# Patient Record
Sex: FEMALE | Race: WHITE | NOT HISPANIC OR LATINO | URBAN - METROPOLITAN AREA
[De-identification: names, ages, dates, MRNs, and addresses within clinical notes are randomized per-mention and may not be internally consistent; named-entity substitution may affect disease eponyms.]

---

## 2020-07-31 ENCOUNTER — OTHER- (OUTPATIENT)
Dept: URBAN - METROPOLITAN AREA CLINIC 8 | Facility: CLINIC | Age: 80
Setting detail: DERMATOLOGY
End: 2020-07-31

## 2020-07-31 DIAGNOSIS — D18.01 HEMANGIOMA OF SKIN AND SUBCUTANEOUS TISSUE: ICD-10-CM

## 2020-07-31 DIAGNOSIS — L81.4 OTHER MELANIN HYPERPIGMENTATION: ICD-10-CM

## 2020-07-31 DIAGNOSIS — L85.3 XEROSIS CUTIS: ICD-10-CM

## 2020-07-31 DIAGNOSIS — L81.2 FRECKLES: ICD-10-CM

## 2020-07-31 DIAGNOSIS — L82.1 OTHER SEBORRHEIC KERATOSIS: ICD-10-CM

## 2020-07-31 DIAGNOSIS — Z85.828 PERSONAL HISTORY OF OTHER MALIGNANT NEOPLASM OF SKIN: ICD-10-CM

## 2020-07-31 DIAGNOSIS — D22.5 MELANOCYTIC NEVI OF TRUNK: ICD-10-CM

## 2020-07-31 PROCEDURE — 11104 PUNCH BX SKIN SINGLE LESION: CPT

## 2020-07-31 PROCEDURE — 99203 OFFICE O/P NEW LOW 30 MIN: CPT

## 2020-07-31 PROCEDURE — 11105 PUNCH BX SKIN EA SEP/ADDL: CPT

## 2020-08-11 ENCOUNTER — RX ONLY (RX ONLY)
Age: 80
End: 2020-08-11

## 2020-08-11 RX ORDER — TRIAMCINOLONE ACETONIDE 1 MG/G
1 APPLICATION OINTMENT TOPICAL BID
Qty: 80 | Refills: 1
Start: 2020-08-11

## 2020-08-26 ENCOUNTER — FOLLOW-UP (OUTPATIENT)
Dept: URBAN - METROPOLITAN AREA CLINIC 15 | Facility: CLINIC | Age: 80
Setting detail: DERMATOLOGY
End: 2020-08-26

## 2020-08-26 DIAGNOSIS — L82.1 OTHER SEBORRHEIC KERATOSIS: ICD-10-CM

## 2020-08-26 DIAGNOSIS — L91.8 OTHER HYPERTROPHIC DISORDERS OF THE SKIN: ICD-10-CM

## 2020-08-26 DIAGNOSIS — L81.4 OTHER MELANIN HYPERPIGMENTATION: ICD-10-CM

## 2020-08-26 DIAGNOSIS — L20.84 INTRINSIC (ALLERGIC) ECZEMA: ICD-10-CM

## 2020-08-26 PROCEDURE — 99212 OFFICE O/P EST SF 10 MIN: CPT

## 2020-08-26 PROCEDURE — 10060 I&D ABSCESS SIMPLE/SINGLE: CPT

## 2020-08-26 RX ORDER — TRIAMCINOLONE ACETONIDE 1 MG/G
1 APPLICATION OINTMENT TOPICAL BID
Qty: 80 | Refills: 2
Start: 2020-08-26

## 2020-11-30 ENCOUNTER — FOLLOW-UP (OUTPATIENT)
Dept: URBAN - METROPOLITAN AREA CLINIC 15 | Facility: CLINIC | Age: 80
Setting detail: DERMATOLOGY
End: 2020-11-30

## 2020-11-30 DIAGNOSIS — C44.729 SQUAMOUS CELL CARCINOMA OF SKIN OF LEFT LOWER LIMB, INCLUDING HIP: ICD-10-CM

## 2020-11-30 PROCEDURE — 99213 OFFICE O/P EST LOW 20 MIN: CPT

## 2023-02-08 ENCOUNTER — APPOINTMENT (OUTPATIENT)
Dept: URBAN - METROPOLITAN AREA CLINIC 216 | Age: 83
Setting detail: DERMATOLOGY
End: 2023-02-08

## 2023-02-08 DIAGNOSIS — D485 NEOPLASM OF UNCERTAIN BEHAVIOR OF SKIN: ICD-10-CM

## 2023-02-08 DIAGNOSIS — D22 MELANOCYTIC NEVI: ICD-10-CM

## 2023-02-08 PROBLEM — D48.5 NEOPLASM OF UNCERTAIN BEHAVIOR OF SKIN: Status: ACTIVE | Noted: 2023-02-08

## 2023-02-08 PROBLEM — D22.39 MELANOCYTIC NEVI OF OTHER PARTS OF FACE: Status: ACTIVE | Noted: 2023-02-08

## 2023-02-08 PROCEDURE — 99212 OFFICE O/P EST SF 10 MIN: CPT | Mod: 25

## 2023-02-08 PROCEDURE — OTHER REASSURANCE: OTHER

## 2023-02-08 PROCEDURE — OTHER BIOPSY BY PUNCH METHOD: OTHER

## 2023-02-08 PROCEDURE — 11104 PUNCH BX SKIN SINGLE LESION: CPT

## 2023-02-08 ASSESSMENT — LOCATION DETAILED DESCRIPTION DERM
LOCATION DETAILED: RIGHT MIDDLE FINGERTIP
LOCATION DETAILED: INFERIOR MID FOREHEAD

## 2023-02-08 ASSESSMENT — LOCATION ZONE DERM
LOCATION ZONE: FACE
LOCATION ZONE: FINGER

## 2023-02-08 ASSESSMENT — LOCATION SIMPLE DESCRIPTION DERM
LOCATION SIMPLE: INFERIOR FOREHEAD
LOCATION SIMPLE: RIGHT MIDDLE FINGER

## 2023-02-22 ENCOUNTER — APPOINTMENT (OUTPATIENT)
Dept: URBAN - METROPOLITAN AREA CLINIC 216 | Age: 83
Setting detail: DERMATOLOGY
End: 2023-02-22

## 2023-02-22 DIAGNOSIS — Z48.817 ENCOUNTER FOR SURGICAL AFTERCARE FOLLOWING SURGERY ON THE SKIN AND SUBCUTANEOUS TISSUE: ICD-10-CM

## 2023-02-22 PROCEDURE — 99024 POSTOP FOLLOW-UP VISIT: CPT

## 2023-02-22 PROCEDURE — OTHER SUTURE REMOVAL (GLOBAL PERIOD): OTHER

## 2023-02-22 ASSESSMENT — LOCATION ZONE DERM: LOCATION ZONE: FINGER

## 2023-02-22 ASSESSMENT — LOCATION DETAILED DESCRIPTION DERM: LOCATION DETAILED: RIGHT MIDDLE FINGERTIP

## 2023-02-22 ASSESSMENT — LOCATION SIMPLE DESCRIPTION DERM: LOCATION SIMPLE: RIGHT MIDDLE FINGER

## 2023-02-22 NOTE — PROCEDURE: SUTURE REMOVAL (GLOBAL PERIOD)
Detail Level: Detailed
Add 55554 Cpt? (Important Note: In 2017 The Use Of 59143 Is Being Tracked By Cms To Determine Future Global Period Reimbursement For Global Periods): yes

## 2023-05-09 ENCOUNTER — APPOINTMENT (OUTPATIENT)
Dept: URBAN - METROPOLITAN AREA CLINIC 216 | Age: 83
Setting detail: DERMATOLOGY
End: 2023-05-10

## 2023-05-09 DIAGNOSIS — L82.1 OTHER SEBORRHEIC KERATOSIS: ICD-10-CM

## 2023-05-09 DIAGNOSIS — L30.9 DERMATITIS, UNSPECIFIED: ICD-10-CM

## 2023-05-09 PROCEDURE — OTHER COUNSELING: OTHER

## 2023-05-09 PROCEDURE — 99213 OFFICE O/P EST LOW 20 MIN: CPT

## 2023-05-09 ASSESSMENT — LOCATION SIMPLE DESCRIPTION DERM
LOCATION SIMPLE: CHEST
LOCATION SIMPLE: RIGHT LOWER BACK
LOCATION SIMPLE: RIGHT UPPER BACK
LOCATION SIMPLE: UPPER BACK

## 2023-05-09 ASSESSMENT — LOCATION DETAILED DESCRIPTION DERM
LOCATION DETAILED: SUPERIOR THORACIC SPINE
LOCATION DETAILED: RIGHT MEDIAL SUPERIOR CHEST
LOCATION DETAILED: RIGHT INFERIOR UPPER BACK
LOCATION DETAILED: RIGHT SUPERIOR LATERAL MIDBACK

## 2023-05-09 ASSESSMENT — LOCATION ZONE DERM: LOCATION ZONE: TRUNK

## 2023-05-09 NOTE — PROCEDURE: COUNSELING
Detail Level: Simple
Detail Level: Zone
Patient Specific Counseling (Will Not Stick From Patient To Patient): Favor irritant or allergic contact derm, but explained to pt we will need biopsy if no improve,ent in 2-3 weeks to rule out more sinister diagnosis such as breast cancer.  Pt voiced understanding

## 2024-05-29 ENCOUNTER — APPOINTMENT (OUTPATIENT)
Dept: URBAN - METROPOLITAN AREA CLINIC 216 | Age: 84
Setting detail: DERMATOLOGY
End: 2024-05-29

## 2024-05-29 DIAGNOSIS — D18.0 HEMANGIOMA: ICD-10-CM

## 2024-05-29 DIAGNOSIS — L81.4 OTHER MELANIN HYPERPIGMENTATION: ICD-10-CM

## 2024-05-29 DIAGNOSIS — L82.1 OTHER SEBORRHEIC KERATOSIS: ICD-10-CM

## 2024-05-29 DIAGNOSIS — D22 MELANOCYTIC NEVI: ICD-10-CM

## 2024-05-29 PROBLEM — D22.9 MELANOCYTIC NEVI, UNSPECIFIED: Status: ACTIVE | Noted: 2024-05-29

## 2024-05-29 PROBLEM — D18.01 HEMANGIOMA OF SKIN AND SUBCUTANEOUS TISSUE: Status: ACTIVE | Noted: 2024-05-29

## 2024-05-29 PROCEDURE — OTHER COUNSELING: OTHER

## 2024-05-29 PROCEDURE — OTHER REASSURANCE: OTHER

## 2024-05-29 PROCEDURE — 99213 OFFICE O/P EST LOW 20 MIN: CPT

## 2024-05-29 ASSESSMENT — LOCATION SIMPLE DESCRIPTION DERM
LOCATION SIMPLE: RIGHT FOREHEAD
LOCATION SIMPLE: UPPER BACK
LOCATION SIMPLE: ABDOMEN
LOCATION SIMPLE: CHEST
LOCATION SIMPLE: LEFT FOREARM
LOCATION SIMPLE: RIGHT UPPER BACK

## 2024-05-29 ASSESSMENT — LOCATION DETAILED DESCRIPTION DERM
LOCATION DETAILED: RIGHT INFERIOR LATERAL FOREHEAD
LOCATION DETAILED: EPIGASTRIC SKIN
LOCATION DETAILED: RIGHT MEDIAL UPPER BACK
LOCATION DETAILED: LEFT MEDIAL SUPERIOR CHEST
LOCATION DETAILED: LEFT PROXIMAL DORSAL FOREARM
LOCATION DETAILED: SUPERIOR THORACIC SPINE

## 2024-05-29 ASSESSMENT — LOCATION ZONE DERM
LOCATION ZONE: TRUNK
LOCATION ZONE: ARM
LOCATION ZONE: FACE

## 2025-05-29 ENCOUNTER — APPOINTMENT (OUTPATIENT)
Dept: URBAN - METROPOLITAN AREA CLINIC 216 | Age: 85
Setting detail: DERMATOLOGY
End: 2025-05-31

## 2025-05-29 DIAGNOSIS — D18.0 HEMANGIOMA: ICD-10-CM

## 2025-05-29 DIAGNOSIS — L81.4 OTHER MELANIN HYPERPIGMENTATION: ICD-10-CM

## 2025-05-29 DIAGNOSIS — B07.8 OTHER VIRAL WARTS: ICD-10-CM

## 2025-05-29 DIAGNOSIS — D22 MELANOCYTIC NEVI: ICD-10-CM

## 2025-05-29 DIAGNOSIS — L82.1 OTHER SEBORRHEIC KERATOSIS: ICD-10-CM

## 2025-05-29 PROBLEM — D18.01 HEMANGIOMA OF SKIN AND SUBCUTANEOUS TISSUE: Status: ACTIVE | Noted: 2025-05-29

## 2025-05-29 PROBLEM — D22.62 MELANOCYTIC NEVI OF LEFT UPPER LIMB, INCLUDING SHOULDER: Status: ACTIVE | Noted: 2025-05-29

## 2025-05-29 PROCEDURE — 99213 OFFICE O/P EST LOW 20 MIN: CPT | Mod: 25

## 2025-05-29 PROCEDURE — 17110 DESTRUCT B9 LESION 1-14: CPT

## 2025-05-29 PROCEDURE — OTHER COUNSELING: OTHER

## 2025-05-29 PROCEDURE — OTHER BENIGN DESTRUCTION: OTHER

## 2025-05-29 PROCEDURE — OTHER REASSURANCE: OTHER

## 2025-05-29 ASSESSMENT — LOCATION DETAILED DESCRIPTION DERM
LOCATION DETAILED: LEFT ANTERIOR SHOULDER
LOCATION DETAILED: RIGHT DISTAL RADIAL THUMB
LOCATION DETAILED: PERIUMBILICAL SKIN

## 2025-05-29 ASSESSMENT — LOCATION ZONE DERM
LOCATION ZONE: TRUNK
LOCATION ZONE: ARM
LOCATION ZONE: FINGER

## 2025-05-29 ASSESSMENT — LOCATION SIMPLE DESCRIPTION DERM
LOCATION SIMPLE: LEFT SHOULDER
LOCATION SIMPLE: RIGHT THUMB
LOCATION SIMPLE: ABDOMEN